# Patient Record
Sex: FEMALE | Race: OTHER | HISPANIC OR LATINO | URBAN - METROPOLITAN AREA
[De-identification: names, ages, dates, MRNs, and addresses within clinical notes are randomized per-mention and may not be internally consistent; named-entity substitution may affect disease eponyms.]

---

## 2023-07-06 ENCOUNTER — EMERGENCY (EMERGENCY)
Facility: HOSPITAL | Age: 58
LOS: 1 days | Discharge: ANOTHER HOSPITAL | End: 2023-07-06
Attending: EMERGENCY MEDICINE | Admitting: EMERGENCY MEDICINE
Payer: SELF-PAY

## 2023-07-06 ENCOUNTER — EMERGENCY (EMERGENCY)
Facility: HOSPITAL | Age: 58
LOS: 1 days | Discharge: HOME | End: 2023-07-06
Attending: STUDENT IN AN ORGANIZED HEALTH CARE EDUCATION/TRAINING PROGRAM | Admitting: STUDENT IN AN ORGANIZED HEALTH CARE EDUCATION/TRAINING PROGRAM
Payer: COMMERCIAL

## 2023-07-06 VITALS
TEMPERATURE: 98 F | HEIGHT: 65 IN | RESPIRATION RATE: 16 BRPM | SYSTOLIC BLOOD PRESSURE: 99 MMHG | OXYGEN SATURATION: 96 % | WEIGHT: 132.28 LBS | HEART RATE: 68 BPM | DIASTOLIC BLOOD PRESSURE: 60 MMHG

## 2023-07-06 VITALS
OXYGEN SATURATION: 95 % | SYSTOLIC BLOOD PRESSURE: 117 MMHG | TEMPERATURE: 100 F | RESPIRATION RATE: 17 BRPM | HEART RATE: 69 BPM | DIASTOLIC BLOOD PRESSURE: 53 MMHG

## 2023-07-06 VITALS
HEIGHT: 65 IN | OXYGEN SATURATION: 98 % | DIASTOLIC BLOOD PRESSURE: 87 MMHG | HEART RATE: 88 BPM | WEIGHT: 130.07 LBS | SYSTOLIC BLOOD PRESSURE: 125 MMHG | TEMPERATURE: 100 F | RESPIRATION RATE: 19 BRPM
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
## 2023-07-06 DIAGNOSIS — Z94.4 LIVER TRANSPLANT STATUS: ICD-10-CM

## 2023-07-06 DIAGNOSIS — S52.201A UNSPECIFIED FRACTURE OF SHAFT OF RIGHT ULNA, INITIAL ENCOUNTER FOR CLOSED FRACTURE: ICD-10-CM
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
## 2023-07-06 DIAGNOSIS — Z87.19 PERSONAL HISTORY OF OTHER DISEASES OF THE DIGESTIVE SYSTEM: ICD-10-CM
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
## 2023-07-06 DIAGNOSIS — S52.501A UNSPECIFIED FRACTURE OF THE LOWER END OF RIGHT RADIUS, INITIAL ENCOUNTER FOR CLOSED FRACTURE: ICD-10-CM

## 2023-07-06 LAB
ALBUMIN SERPL ELPH-MCNC: 3.2 G/DL — LOW (ref 3.4–5)
ALP SERPL-CCNC: 131 U/L — HIGH (ref 40–120)
ALT FLD-CCNC: 81 U/L — HIGH (ref 12–42)
ANION GAP SERPL CALC-SCNC: 10 MMOL/L — SIGNIFICANT CHANGE UP (ref 9–16)
AST SERPL-CCNC: 44 U/L — HIGH (ref 15–37)
BILIRUB SERPL-MCNC: 0.9 MG/DL — SIGNIFICANT CHANGE UP (ref 0.2–1.2)
BUN SERPL-MCNC: 11 MG/DL — SIGNIFICANT CHANGE UP (ref 7–23)
CALCIUM SERPL-MCNC: 8.8 MG/DL — SIGNIFICANT CHANGE UP (ref 8.5–10.5)
CHLORIDE SERPL-SCNC: 108 MMOL/L — SIGNIFICANT CHANGE UP (ref 96–108)
CO2 SERPL-SCNC: 24 MMOL/L — SIGNIFICANT CHANGE UP (ref 22–31)
CREAT SERPL-MCNC: 0.73 MG/DL — SIGNIFICANT CHANGE UP (ref 0.5–1.3)
EGFR: 96 ML/MIN/1.73M2 — SIGNIFICANT CHANGE UP
GLUCOSE SERPL-MCNC: 111 MG/DL — HIGH (ref 70–99)
HCT VFR BLD CALC: 35.4 % — SIGNIFICANT CHANGE UP (ref 34.5–45)
HGB BLD-MCNC: 11.4 G/DL — LOW (ref 11.5–15.5)
MCHC RBC-ENTMCNC: 30.5 PG — SIGNIFICANT CHANGE UP (ref 27–34)
MCHC RBC-ENTMCNC: 32.2 GM/DL — SIGNIFICANT CHANGE UP (ref 32–36)
MCV RBC AUTO: 94.7 FL — SIGNIFICANT CHANGE UP (ref 80–100)
NRBC # BLD: 0 /100 WBCS — SIGNIFICANT CHANGE UP (ref 0–0)
PLATELET # BLD AUTO: 108 K/UL — LOW (ref 150–400)
POTASSIUM SERPL-MCNC: 3 MMOL/L — LOW (ref 3.5–5.3)
POTASSIUM SERPL-SCNC: 3 MMOL/L — LOW (ref 3.5–5.3)
PROT SERPL-MCNC: 5.9 G/DL — LOW (ref 6.4–8.2)
RBC # BLD: 3.74 M/UL — LOW (ref 3.8–5.2)
RBC # FLD: 13.5 % — SIGNIFICANT CHANGE UP (ref 10.3–14.5)
SODIUM SERPL-SCNC: 142 MMOL/L — SIGNIFICANT CHANGE UP (ref 132–145)
WBC # BLD: 3.62 K/UL — LOW (ref 3.8–10.5)
WBC # FLD AUTO: 3.62 K/UL — LOW (ref 3.8–10.5)

## 2023-07-06 PROCEDURE — 99285 EMERGENCY DEPT VISIT HI MDM: CPT | Mod: 25

## 2023-07-06 PROCEDURE — 73110 X-RAY EXAM OF WRIST: CPT | Mod: 26,RT

## 2023-07-06 PROCEDURE — 73090 X-RAY EXAM OF FOREARM: CPT | Mod: 26,RT,76

## 2023-07-06 PROCEDURE — 99284 EMERGENCY DEPT VISIT MOD MDM: CPT

## 2023-07-06 PROCEDURE — 29125 APPL SHORT ARM SPLINT STATIC: CPT

## 2023-07-06 RX ORDER — HYDROMORPHONE HYDROCHLORIDE 2 MG/ML
0.5 INJECTION INTRAMUSCULAR; INTRAVENOUS; SUBCUTANEOUS ONCE
Refills: 0 | Status: DISCONTINUED | OUTPATIENT
Start: 2023-07-06 | End: 2023-07-06

## 2023-07-06 RX ORDER — SODIUM CHLORIDE 9 MG/ML
1000 INJECTION INTRAMUSCULAR; INTRAVENOUS; SUBCUTANEOUS ONCE
Refills: 0 | Status: COMPLETED | OUTPATIENT
Start: 2023-07-06 | End: 2023-07-07

## 2023-07-06 RX ORDER — ONDANSETRON 8 MG/1
4 TABLET, FILM COATED ORAL ONCE
Refills: 0 | Status: COMPLETED | OUTPATIENT
Start: 2023-07-06 | End: 2023-07-06

## 2023-07-06 RX ORDER — MORPHINE SULFATE 50 MG/1
4 CAPSULE, EXTENDED RELEASE ORAL ONCE
Refills: 0 | Status: DISCONTINUED | OUTPATIENT
Start: 2023-07-06 | End: 2023-07-06

## 2023-07-06 RX ORDER — FENTANYL CITRATE 50 UG/ML
50 INJECTION INTRAVENOUS ONCE
Refills: 0 | Status: DISCONTINUED | OUTPATIENT
Start: 2023-07-06 | End: 2023-07-06

## 2023-07-06 RX ORDER — ACETAMINOPHEN 500 MG
975 TABLET ORAL ONCE
Refills: 0 | Status: COMPLETED | OUTPATIENT
Start: 2023-07-06 | End: 2023-07-06

## 2023-07-06 RX ORDER — HYDROMORPHONE HYDROCHLORIDE 2 MG/ML
1 INJECTION INTRAMUSCULAR; INTRAVENOUS; SUBCUTANEOUS ONCE
Refills: 0 | Status: DISCONTINUED | OUTPATIENT
Start: 2023-07-06 | End: 2023-07-06

## 2023-07-06 RX ADMIN — Medication 975 MILLIGRAM(S): at 21:10

## 2023-07-06 RX ADMIN — ONDANSETRON 4 MILLIGRAM(S): 8 TABLET, FILM COATED ORAL at 23:59

## 2023-07-06 RX ADMIN — HYDROMORPHONE HYDROCHLORIDE 0.5 MILLIGRAM(S): 2 INJECTION INTRAMUSCULAR; INTRAVENOUS; SUBCUTANEOUS at 20:52

## 2023-07-06 RX ADMIN — HYDROMORPHONE HYDROCHLORIDE 0.5 MILLIGRAM(S): 2 INJECTION INTRAMUSCULAR; INTRAVENOUS; SUBCUTANEOUS at 23:59

## 2023-07-06 RX ADMIN — HYDROMORPHONE HYDROCHLORIDE 0.5 MILLIGRAM(S): 2 INJECTION INTRAMUSCULAR; INTRAVENOUS; SUBCUTANEOUS at 22:45

## 2023-07-06 RX ADMIN — FENTANYL CITRATE 50 MICROGRAM(S): 50 INJECTION INTRAVENOUS at 13:08

## 2023-07-06 RX ADMIN — MORPHINE SULFATE 4 MILLIGRAM(S): 50 CAPSULE, EXTENDED RELEASE ORAL at 18:47

## 2023-07-06 NOTE — ED PROVIDER NOTE - CARE PLAN
1 Principal Discharge DX:	Fracture of distal end of left radius and ulna   Principal Discharge DX:	Closed fracture of right distal radius and ulna

## 2023-07-06 NOTE — ED ADULT NURSE NOTE - NSFALLUNIVINTERV_ED_ALL_ED
Bed/Stretcher in lowest position, wheels locked, appropriate side rails in place/Call bell, personal items and telephone in reach/Instruct patient to call for assistance before getting out of bed/chair/stretcher/Non-slip footwear applied when patient is off stretcher/[place] to call system/Physically safe environment - no spills, clutter or unnecessary equipment/Purposeful proactive rounding/Room/bathroom lighting operational, light cord in reach

## 2023-07-06 NOTE — ED ADULT NURSE NOTE - CHIEF COMPLAINT QUOTE
Patient is a transfer from [facility] for ortho consult re: right wrist distal fracture.

## 2023-07-06 NOTE — ED PROVIDER NOTE - PHYSICAL EXAMINATION
CONSTITUTIONAL   General appearance: looks well, no acute distress, alert, interactive, pleasant, answers questions appropriately    EYES   RIGHT eye: Normal Conjunctiva and Lid   LEFT eye: Normal Conjunctiva and Lid   Sclerae: NO subconjunctival hemorrhage, No scleral icterus   PUPILS: PERRL   EOM: EOMI     EAR / NOSE / THROAT   Moist mucous membranes      NECK   Supple, no meningismus, trachea midline, full ROM     CARDIOVASCULAR   Well perfused      LUNGS   Respiratory effort: No respiratory distress, normal respiration effort, speaking in Full Sentences     ABDOMEN   No distention      SKIN   Normal color, NO rash, NO erythema, NO bruising, NO skin lesion     MSK - LUE  Obvious deformity to left distal forearm/wrist, slight abrasion but no lacerations, distal pulses and sensation intaction, splinted PTA by EMS. Elbow and prox forearm NT. Hand/digits NT.     PSYCHIATRIC   Judgement and insight intact, normal mood and affect, patient at baseline MS     NEUROLOGIC    Alert and oriented x 3, Speech normal, No focal deficits, normal motor and sensory, MAEx4 CONSTITUTIONAL   General appearance: looks well, no acute distress, alert, interactive, pleasant, answers questions appropriately    EYES   RIGHT eye: Normal Conjunctiva and Lid   LEFT eye: Normal Conjunctiva and Lid   Sclerae: NO subconjunctival hemorrhage, No scleral icterus   PUPILS: PERRL   EOM: EOMI     EAR / NOSE / THROAT   Moist mucous membranes      NECK   Supple, no meningismus, trachea midline, full ROM     CARDIOVASCULAR   Well perfused      LUNGS   Respiratory effort: No respiratory distress, normal respiration effort, speaking in Full Sentences     ABDOMEN   No distention      SKIN   Normal color, NO rash, NO erythema, NO bruising, NO skin lesion     MSK - RUE  Obvious deformity to R distal forearm/wrist, slight abrasion but no lacerations, distal pulses and sensation intact, splinted PTA by EMS. Elbow and prox forearm NT. Hand/digits NT.     PSYCHIATRIC   Judgement and insight intact, normal mood and affect, patient at baseline MS     NEUROLOGIC    Alert and oriented x 3, Speech normal, No focal deficits, normal motor and sensory, MAEx4

## 2023-07-06 NOTE — ED ADULT NURSE NOTE - NSFALLHARMRISKINTERV_ED_ALL_ED
Assistance OOB with selected safe patient handling equipment if applicable/Assistance with ambulation/Communicate risk of Fall with Harm to all staff, patient, and family/Monitor gait and stability/Provide visual cue: red socks, yellow wristband, yellow gown, etc/Reinforce activity limits and safety measures with patient and family/Bed in lowest position, wheels locked, appropriate side rails in place/Call bell, personal items and telephone in reach/Instruct patient to call for assistance before getting out of bed/chair/stretcher/Non-slip footwear applied when patient is off stretcher/[place] to call system/Physically safe environment - no spills, clutter or unnecessary equipment/Purposeful Proactive Rounding/Room/bathroom lighting operational, light cord in reach

## 2023-07-06 NOTE — ED PROCEDURE NOTE - NS_EDPROVIDERDISPOUSERTYPE_ED_A_ED
Attending Attestation (For Attendings USE Only)... Detail Level: Simple Additional Notes: Medications: \\n\\nNeosporin apply to axillae twice a day for 1 week\\nHibiclens use to wash axillae once a day for 1 week\\nDoxycycline 100 mg tab: take 1 tab with food every 12 hours for 5 days. \\nMedrol dose pack: take with food as directed. \\nPost-care reviewed and sun protection advised.

## 2023-07-06 NOTE — ED PROVIDER NOTE - CLINICAL SUMMARY MEDICAL DECISION MAKING FREE TEXT BOX
right distal radius ulna fx, closed  splinted here in ED  spoke to dr tomlin and ER attending at [facility]  will transfer for reduction

## 2023-07-06 NOTE — ED PROVIDER NOTE - ATTENDING APP SHARED VISIT CONTRIBUTION OF CARE
57-year-old female with accidental fall, comminuted and displaced distal radius and ulnar fracture on x-ray.  Attempted reduction in the ED and sugar-tong splint placed.  Postprocedure x-ray with slight improvement in her fracture, no improvement radius fracture. Pain controlled in the ED with splint IV pain meds. Consulted orthopedic surgery Dr. Henriquez - he recommended ER transfer for orthopedic evaluation.

## 2023-07-06 NOTE — ED ADULT NURSE NOTE - OBJECTIVE STATEMENT
Patient with h/o liver transplant and ulcerative colitis presents with 10/10 right wrist pain and deformity s/p trip and fall outside the street (right arm was outstretched). In arm sling by EMS. Denies numbness or tingling sensation.

## 2023-07-06 NOTE — ED ADULT TRIAGE NOTE - CHIEF COMPLAINT QUOTE
Patient is a transfer from [facility] for ortho consult re: right wrist distal fracture.

## 2023-07-06 NOTE — ED ADULT NURSE NOTE - BIRTH SEX
"*  Continue all medications at the same doses.  Contact your usual pharmacy if you need refills.     *  Pneumonia vaccine given today    *  COlonoscopy next due 2022.     *  Return to see me in 1 year, sooner if needed.  Please get fasting labs done at the [facility] or any other [facility] lab 1-2 days before this appointment.  If you get the labs done at another [facility] make arrangements with them directly.  The orders will be in place.  Eat nothing for at least 8 hours prior to having these labs drawn.  Call [phone] to schedule appointments at [facility].       5 GOALS TO PREVENT VASCULAR DISEASE:     1.  Aggressive blood pressure control, under 130/80 ideally.  Using medications if needed.    Your blood pressure is under good control    BP Readings from Last 4 Encounters:   07/09/18 142/70   05/04/17 138/72   03/17/16 126/66   12/01/14 132/74       2.  Aggressive LDL cholesterol (\"bad cholesterol\") lowering as indicated.    Your goal is an LDL under 130 for sure, preferably under 100.  (If you have diabetes or previous vascular disease, the the LDL goals would be under 100 for sure, preferably under 70.)    New guidelines identify four high-risk groups who could benefit from statins:   *people with pre-existing heart disease, such as those who have had a heart attack;   *people ages 40 to 75 who have diabetes of any type  *patients ages 40 to 75 with at least a 7.5% risk of developing cardiovascular disease over the next decade, according to a formula described in the guidelines  *patients with the sort of super-high cholesterol that sometimes runs in families, as evidenced by an LDL of 190 milligrams per deciliter or higher    Your cholesterol levels are well controlled.    Recent Labs   Lab Test  05/04/17   0932  03/16/16   0837  11/24/14   0813  08/13/13   0927   CHOL  186  168  170  181   HDL  38*  34*  40*  49   LDL  83  79  68  95   TRIG  323*  275*  308*  182* " "  CHOLHDLRATIO   --    --   4.2  3.7       3.  Aggressive diabetic prevention, screening and/or management.      You do not have diabetes as of the most recent blood tests.     4.  No smoking    5.  Consider taking low dose aspirin (81 mg) tablet once per day over the age of 50, every day unless there is a specific reason that you cannot take aspirin (such as side effect, allergy, or you are on another \"blood thinner\").        --Based on your current cardiac risk factors, you should take Aspirin 81 mg once per day if you are over 50 years of age.         SHINGLES VACCINE:     I would recommend that you consider getting a \"shingles vaccine\".  The shingles vaccine does not stop you from getting shingles, but it decreases the intensity of the event, the duration of the event, and decreases the painful nerve condition that results     There are two options available:     --Shingrix (available starting early 2018), 2 shots, 2-6 months apart  **recommended**   OR   --Zostavax, one shot    --Based on the available data, the Shingrix vaccine has superior benefit and should be considered even if you have had the Zostavax vaccine before.      --Contact your insurance provider and ask them if either shingles vaccine is covered and is so, how much it will cost you.  Usually this will be cheaper to get in a pharmacy given by the pharmacist.    --Regardless of the coverage, I would recommend that you consider the vaccine since shingles is very painful, just ask anyone who has ever had it.                 Preventive Health Recommendations:       Male Ages 65 and over    Yearly exam:             See your health care provider every year in order to  o   Review health changes.   o   Discuss preventive care.    o   Review your medicines if your doctor has prescribed any.    Talk with your health care provider about whether you should have a test to screen for prostate cancer (PSA).    Every 3 years, have a diabetes test (fasting " "glucose). If you are at risk for diabetes, you should have this test more often.    Every 5 years, have a cholesterol test. Have this test more often if you are at risk for high cholesterol or heart disease.     Every 10 years, have a colonoscopy. Or, have a yearly FIT test (stool test). These exams will check for colon cancer.    Talk to with your health care provider about screening for Abdominal Aortic Aneurysm if you have a family history of AAA or have a history of smoking.  Shots:     Get a flu shot each year.     Get a tetanus shot every 10 years.     Talk to your doctor about your pneumonia vaccines. There are now two you should receive - Pneumovax (PPSV 23) and Prevnar (PCV 13).    Talk to your pharmacist about a shingles vaccine.     Talk to your doctor about the hepatitis B vaccine.     --Good Grains:  Oats, brown rice, Quinoa (these do not raise the blood sugar as much)     --Bad grains:  Anything made from wheat or white rice     (because these raise the blood sugars significantly, and the possible gluten issue from wheat for some people).      --Proteins:  Aim for \"lean proteins\" including chicken, fish, seafood, pork, turkey, and eggs (in moderation); Eat red meat only occasionally      Nutrition:     Eat at least 5 servings of fruits and vegetables each day.     Eat whole-grain bread, whole-wheat pasta and brown rice instead of white grains and rice.     Get adequate Calcium and Vitamin D.   Lifestyle    Exercise for at least 150 minutes a week (30 minutes a day, 5 days a week). This will help you control your weight and prevent disease.     Limit alcohol to one drink per day.     No smoking.     Wear sunscreen to prevent skin cancer.     See your dentist every six months for an exam and cleaning.     See your eye doctor every 1 to 2 years to screen for conditions such as glaucoma, macular degeneration and cataracts.  " Male

## 2023-07-06 NOTE — ED ADULT NURSE NOTE - OBJECTIVE STATEMENT
Patient is a 57yoF transferred fro [facility] for ortho c/s. Pt is axox3, spont breathing on RA. States that she tripped as she was walking down the street. States that she fell onto her right side, denies HS. Patient has cast on her right hand, endorsing pain, denies numbness/tingling. Made aware that she is awaiting ortho consult at this time.

## 2023-07-06 NOTE — ED PROVIDER NOTE - OBJECTIVE STATEMENT
56 yo F patient with PMH of liver transplant and UC, here with left wrist pain s/p fall PTA. Pt BIBA with family. Fall onto L wrist, no other injuries. No head injury or LOC. 56 yo F patient with PMH of liver transplant and UC, here with right wrist pain s/p fall PTA. Pt BIBA with family. Fall onto R wrist, no other injuries. No head injury or LOC.

## 2023-07-06 NOTE — ED ADULT TRIAGE NOTE - ARRIVAL INFO ADDITIONAL COMMENTS
Patient initially seen at [facility]. Patient was administered IV Fentanyl 50mcg at 1248 and IV Morphine 4mg at 1837. Patient reported to have developed at fever prior to transfer and was administered PO acetaminophen.

## 2023-07-07 VITALS
RESPIRATION RATE: 17 BRPM | HEART RATE: 62 BPM | OXYGEN SATURATION: 99 % | DIASTOLIC BLOOD PRESSURE: 60 MMHG | SYSTOLIC BLOOD PRESSURE: 114 MMHG | TEMPERATURE: 98 F

## 2023-07-07 PROCEDURE — 25605 CLTX DST RDL FX/EPHYS SEP W/: CPT | Mod: RT

## 2023-07-07 PROCEDURE — 73110 X-RAY EXAM OF WRIST: CPT

## 2023-07-07 PROCEDURE — 96374 THER/PROPH/DIAG INJ IV PUSH: CPT | Mod: XU

## 2023-07-07 PROCEDURE — 96376 TX/PRO/DX INJ SAME DRUG ADON: CPT | Mod: XU

## 2023-07-07 PROCEDURE — 96361 HYDRATE IV INFUSION ADD-ON: CPT | Mod: XU

## 2023-07-07 PROCEDURE — 73110 X-RAY EXAM OF WRIST: CPT | Mod: 26,RT

## 2023-07-07 PROCEDURE — 96375 TX/PRO/DX INJ NEW DRUG ADDON: CPT | Mod: XU

## 2023-07-07 PROCEDURE — 99285 EMERGENCY DEPT VISIT HI MDM: CPT | Mod: 25

## 2023-07-07 RX ORDER — ONDANSETRON 8 MG/1
4 TABLET, FILM COATED ORAL ONCE
Refills: 0 | Status: COMPLETED | OUTPATIENT
Start: 2023-07-07 | End: 2023-07-07

## 2023-07-07 RX ORDER — TRAMADOL HYDROCHLORIDE 50 MG/1
1 TABLET ORAL
Qty: 14 | Refills: 0
Start: 2023-07-07 | End: 2023-07-13

## 2023-07-07 RX ORDER — ONDANSETRON 8 MG/1
4 TABLET, FILM COATED ORAL ONCE
Refills: 0 | Status: DISCONTINUED | OUTPATIENT
Start: 2023-07-07 | End: 2023-07-10

## 2023-07-07 RX ORDER — ONDANSETRON 8 MG/1
1 TABLET, FILM COATED ORAL
Qty: 21 | Refills: 0
Start: 2023-07-07 | End: 2023-07-13

## 2023-07-07 RX ADMIN — SODIUM CHLORIDE 1000 MILLILITER(S): 9 INJECTION INTRAMUSCULAR; INTRAVENOUS; SUBCUTANEOUS at 01:35

## 2023-07-07 RX ADMIN — SODIUM CHLORIDE 1000 MILLILITER(S): 9 INJECTION INTRAMUSCULAR; INTRAVENOUS; SUBCUTANEOUS at 02:41

## 2023-07-07 RX ADMIN — ONDANSETRON 4 MILLIGRAM(S): 8 TABLET, FILM COATED ORAL at 02:41

## 2023-07-07 NOTE — ED PROVIDER NOTE - PHYSICAL EXAMINATION
VITAL SIGNS: I have reviewed nursing notes and confirm.  CONSTITUTIONAL: Well appearing, in no acute distress.   SKIN:  warm and dry, no acute rash.   HEAD:  normocephalic, atraumatic.  EYES: EOM intact; conjunctiva and sclera clear.  ENT: No nasal discharge; airway clear.   NECK: Supple.  CARD: S1, S2, Regular rate and rhythm.   RESP:  Clear to auscultation b/l, no wheezes, rales or rhonchi.  ABD: Normal bowel sounds; soft; non-distended; non-tender; no guarding/ rebound.  EXT: Obvious deformity to R distal forearm/wrist, slight abrasion but no lacerations, distal pulses and sensation intact, splinted PTA by EMS. Elbow and prox forearm NT. Hand/digits NT.   NEURO: Alert, oriented, grossly unremarkable  PSYCH: Cooperative, mood and affect appropriate.

## 2023-07-07 NOTE — ED PROVIDER NOTE - NS ED ROS FT
CONSTITUTIONAL: no fevers, chills, recent weight loss, night sweats   HEENT: no eye pain, nasal congestion, rhinorrhea, sore throat   CV: no chest pain, palpitations   PULM: no coughing, SOA, wheezes, pleuritic pain, hemoptysis    GI: no abd pain, n/v/d, constipation, hematemesis, bloody stools, dark/tarry stools   : no flank pain, dysuria, hematuria    MS: no neck pain, back pain   SKIN: no rash   NEURO: no headache, photophobia, phonophobia, vision changes, focal weakness, numbness/tingling, syncope   PSYCH: no SI/HI

## 2023-07-07 NOTE — ED PROVIDER NOTE - CLINICAL SUMMARY MEDICAL DECISION MAKING FREE TEXT BOX
56 y/o female with a PMHx of a liver transplant and ulcerated colitis was transferred from [facility] to [facility] for ortho eval and reduction s/p an accidental fall with a sustained comminuted fracture to her wrist. Pt is c/o of wrist pain and denies any numbness or tingling sensation to her fingers. Anticipate dc home s/p ortho eval and joint reduction and a f/u will be scheduled for an elective surgery.

## 2023-07-07 NOTE — ED PROVIDER NOTE - CARE PROVIDER_API CALL
Omero Henriquez  Orthopaedic Surgery  [address] [place] [address], [address]  [place] [address]  Phone: [phone]  Fax: [phone]  Follow Up Time:

## 2023-07-07 NOTE — ED PROVIDER NOTE - ATTENDING APP SHARED VISIT CONTRIBUTION OF CARE
58 y/o female with a PMHx of a liver transplant and ulcerated colitis was transferred from [facility] to [facility] for ortho eval and reduction s/p an accidental fall with a sustained comminuted fracture to her wrist. Pt is c/o of wrist pain and denies any numbness or tingling sensation to her fingers. Anticipate dc home s/p ortho eval and joint reduction and a f/u will be scheduled for an elective surgery.

## 2023-07-07 NOTE — CONSULT NOTE ADULT - SUBJECTIVE AND OBJECTIVE BOX
Orthopaedic Surgery Consult Note    Attending Physician: Florence  Consult requested by: ED    CC: R wrist pain    HPI:  57yFemale hx of osteoporosis who sustained a mechanical trip and fall early this afternoon over uneven sidewalk with a FOOSH injury. Patient experienced severe wrist pain and wrist looked crooked. She intially presented to [facility] where CR was attempted and placed in a splint. Patient was transferred to [facility] for repeat CR and splinting. Patient denies numbness of the fingers. Denies any previous wrist trauma. Patient is visiting from [place] with plans to drive to [place] tomorrow and eventually return to [place] next wenesday.    Allergies    No Known Allergies    Intolerances      PAST MEDICAL & SURGICAL HISTORY:  Status post liver transplant      Ulcerative colitis          Meds:  sodium chloride 0.9% Bolus 1000 milliLiter(s) IV Bolus once      Family History:  Denies family history of bleeding disorders    Social History:   Pt is a nonsmoker  Social EtOH use     Review of Systems:  All review of systems are negative except for those mentioned in HPI.    Physical Exam:  General: Pt Alert and oriented, NAD  R wrist TTP, swollen, ecchymosis, obvious deformity  Small superificial abrasion over ulnar side of R wrist - no poke holes appreciated  Pulses: 2+ radial pulses, wwp, cap refill <3 seconds  Sensation: SILT radial/median/ulnar distributions  Motor: AIN/PIN/ulnar motors firing    Vital Signs Last 24 Hrs  T(C): 37.8 (06 Jul 2023 21:46), Max: 38 (06 Jul 2023 21:01)  T(F): 100 (06 Jul 2023 21:46), Max: 100.4 (06 Jul 2023 21:01)  HR: 88 (06 Jul 2023 21:46) (68 - 88)  BP: 125/87 (06 Jul 2023 21:46) (99/60 - 125/87)  BP(mean): --  RR: 19 (06 Jul 2023 21:46) (16 - 19)  SpO2: 98% (06 Jul 2023 21:46) (95% - 98%)    Parameters below as of 06 Jul 2023 21:46  Patient On (Oxygen Delivery Method): room air          Labs:                        11.4   3.62  )-----------( 108      ( 06 Jul 2023 12:48 )             35.4     07-06    142  |  108  |  11  ----------------------------<  111<H>  3.0<L>   |  24  |  0.73    Ca    8.8      06 Jul 2023 12:48    TPro  5.9<L>  /  Alb  3.2<L>  /  TBili  0.9  /  DBili  x   /  AST  44<H>  /  ALT  81<H>  /  AlkPhos  131<H>  07-06        Imaging:   Xray  R wrist (AP/lateral/oblique): distal radius fracture with shortening and dorsal communition and associated comminuted ulnar fracture    A/P: 57yFemale with R distal radius fracture s/p [facility] fall. Tx from [facility] to [facility] - now s/p closed reduction in ED.  - stable  - pain control  - hematoma block with 1% lidocaine w/o epi  - closed reduced and placed in sugartong splint, NVI post reduction  - NWB RUE in sling/splint  - f/u outpatient for surgical evaluation with Dr. Henriquez  - Discussed with Attending, Dr. Florence Quevedo, PGY-3  Ortho Pager [phone]

## 2023-07-07 NOTE — ED PROVIDER NOTE - NSFOLLOWUPINSTRUCTIONS_ED_ALL_ED_FT
Please keep your injured extremity in splint and sling for support until follow up with orthopedist and schedule your surgery.          Radial Fracture  Bones of the arm and hand. There is a break, or fracture, in the radius.  A radial fracture is a break in the radius bone. The radius is a bone in the forearm, on the same side as the thumb. The forearm is the part of the arm that is between the elbow and the wrist. A radial fracture near the wrist (distal radialfracture) is the most common type of broken arm. A fracture can also occur near the elbow (radial head fracture).    What are the causes?  The most common cause of a radial fracture is falling with the arm outstretched. Other causes include:  An accident, such as a car or bike accident.  A hard, direct hit to the forearm.  What increases the risk?  You may be at greater risk for a radial fracture if you:  Are female.  Are an older adult.  Play contact sports.  Have a condition that causes your bones to become thin and brittle (osteoporosis).  What are the signs or symptoms?  A radial fracture causes pain immediately after the injury. Other signs and symptoms may include:  An abnormal bend or bump in the arm (deformity).  Swelling.  Tenderness.  Bruising.  Numbness or tingling in your arm and hand.  Limited movement of your arm and hand.  Pain when trying to move your wrist, hand, or elbow.  How is this diagnosed?  This condition may be diagnosed based on:  Your symptoms and medical history.  A physical exam.  An X-ray.  How is this treated?  Treatment depends on how severe your fracture is, where it is, and how the pieces of the broken bone line up with each other (alignment).  Initially, you may need to wear a temporary splint to stabilize the injury for a few days until your swelling goes down. After the swelling goes down, you may get a cast, get a different type of splint, or have surgery.  If your broken bone is not aligned (displaced) or significantly involves other joints (intra-articular fracture), your health care provider will need to align the bone pieces. To align your broken bone, your health care provider may:  Move the bones back into position without surgery (closed reduction).  Perform surgery to align the fracture and fix the bone pieces into place with metal screws, plates, or wires (open reduction and internal fixation).  Perform surgery to align the fracture and fix the bone pieces into place with pins that are attached to a stabilizing bar outside your skin (external fixation).  If there is a cut (laceration) in the skin over the fracture, this may indicate a compound fracture. You may need to take antibiotic medicines and have surgery to clean out the wound and prevent infection of the bones.  Treatment may also include:  Wearing a splint or cast. This keeps your wrist in place (immobilizes) and allows the fractured bone to heal properly.  Having your cast changed after 2–3 weeks.  Physical therapy exercises to improve movement and strength in your arm.  Follow-up visits and X-rays to make sure you are healing.  Follow these instructions at home:  If you have a removable splint:    Wear the splint as told by your health care provider. Remove it only as told by your health care provider.  Check the skin around the splint every day. Tell your health care provider about any concerns.  Loosen the splint if your fingers tingle, become numb, or turn cold and blue.  Keep the splint clean and dry.  If you have a nonremovable cast or splint:    Do not put pressure on any part of the cast or splint until it is fully hardened. This may take several hours.  Do not stick anything inside the cast or splint to scratch your skin. Doing that increases your risk of infection.  Check the skin around the cast or splint every day. Tell your health care provider about any concerns.  You may put lotion on dry skin around the edges of the cast or splint. Do not put lotion on the skin underneath the cast or splint.  Keep it clean and dry.  Bathing    Do not take baths, swim, or use a hot tub until your health care provider approves. Ask your health care provider if you may take showers. You may only be allowed to take sponge baths.  If your splint or cast is not waterproof:  Do not let it get wet.  Cover it with a watertight covering when you take a bath or a shower.  Managing pain, stiffness, and swelling    Bag of ice on a towel on the skin.   If directed, put ice on the painful area. To do this:  If you have a removable splint, remove it as told by your health care provider.  Put ice in a plastic bag.  Place a towel between your skin and the bag, or between your cast or splint and the bag.  Leave the ice on for 20 minutes, 2–3 times a day.  Remove the ice if your skin turns bright red. This is very important. If you cannot feel pain, heat, or cold, you have a greater risk of damage to the area.  Move your fingers often to reduce stiffness and swelling.  Raise (elevate) your arm above the level of your heart while you are sitting or lying down.  Activity    Do not lift anything with your injured arm.  Do not use the injured arm to support your body weight until your health care provider says that you can.  Ask your health care provider what activities are safe for you and what activities you should avoid while you heal.  Do exercises as told by your health care provider or physical therapist.  Driving    Ask your health care provider:  If the medicine prescribed to you requires you to avoid driving or using machinery.  When it is safe to drive if you have a splint or cast on your arm.  General instructions    Take over-the-counter and prescription medicines only as told by your health care provider.  If you were prescribed an antibiotic medicine, take it as told by your health care provider. Do not stop using the antibiotic even if you start to feel better.  Do not use any products that contain nicotine or tobacco. These products include cigarettes, chewing tobacco, and vaping devices, such as e-cigarettes. These can delay bone healing. If you need help quitting, ask your health care provider.  Keep all follow-up visits. This is important.  Contact a health care provider if you have:  Pain that does not get better with medicine.  Swelling that gets worse.  A bad smell coming from your cast.  Get help right away if:  You cannot move your fingers.  You have severe pain, especially if the pain changes significantly or suddenly.  Your fingers or your hand:  Become numb, cold, or pale.  Turn a bluish color.  Summary  A radial fracture is a break in the radius bone.  The most common cause is falling on an outstretched hand. Treatment depends on how severe your fracture is, where it is, and how the pieces of the broken bone line up with each other.  A splint or cast may be needed to help the fracture heal. A more severe fracture may require surgery.  This information is not intended to replace advice given to you by your health care provider. Make sure you discuss any questions you have with your health care provider.

## 2023-07-07 NOTE — ED PROVIDER NOTE - OBJECTIVE STATEMENT
56 y/o female with a PMHx of a liver transplant and ulcerated colitis was transferred from [facility] to [facility] for ortho eval and reduction. Pt had an accidental fall and sustained injuries on her right wrist and sustained a comminuted fracture to her wrist with no other injuries. Pt is c/o of wrist pain and denies any numbness or tingling sensation to her fingers. Pt's injured extremity is currently in a splint.

## 2023-07-07 NOTE — ED PROVIDER NOTE - PATIENT PORTAL LINK FT
You can access the FollowMyHealth Patient Portal offered by [facility] by registering at the following website: [email or web address]. By joining [facility]’s FollowMyHealth portal, you will also be able to view your health information using other applications (apps) compatible with our system.

## 2023-07-10 DIAGNOSIS — S52.601A UNSPECIFIED FRACTURE OF LOWER END OF RIGHT ULNA, INITIAL ENCOUNTER FOR CLOSED FRACTURE: ICD-10-CM

## 2023-07-10 DIAGNOSIS — S52.501A UNSPECIFIED FRACTURE OF THE LOWER END OF RIGHT RADIUS, INITIAL ENCOUNTER FOR CLOSED FRACTURE: ICD-10-CM

## 2023-07-10 DIAGNOSIS — S60.811A ABRASION OF RIGHT WRIST, INITIAL ENCOUNTER: ICD-10-CM

## 2023-07-10 DIAGNOSIS — M25.531 PAIN IN RIGHT WRIST: ICD-10-CM

## 2023-07-10 DIAGNOSIS — Z94.4 LIVER TRANSPLANT STATUS: ICD-10-CM
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
